# Patient Record
Sex: MALE | Race: ASIAN | NOT HISPANIC OR LATINO | URBAN - METROPOLITAN AREA
[De-identification: names, ages, dates, MRNs, and addresses within clinical notes are randomized per-mention and may not be internally consistent; named-entity substitution may affect disease eponyms.]

---

## 2017-04-09 ENCOUNTER — EMERGENCY (EMERGENCY)
Facility: HOSPITAL | Age: 62
LOS: 1 days | Discharge: HOME | End: 2017-04-09
Attending: EMERGENCY MEDICINE | Admitting: EMERGENCY MEDICINE
Payer: COMMERCIAL

## 2017-04-09 VITALS
SYSTOLIC BLOOD PRESSURE: 122 MMHG | RESPIRATION RATE: 18 BRPM | TEMPERATURE: 98 F | OXYGEN SATURATION: 98 % | HEART RATE: 100 BPM | DIASTOLIC BLOOD PRESSURE: 72 MMHG

## 2017-04-09 VITALS
HEART RATE: 71 BPM | OXYGEN SATURATION: 98 % | SYSTOLIC BLOOD PRESSURE: 119 MMHG | RESPIRATION RATE: 18 BRPM | DIASTOLIC BLOOD PRESSURE: 79 MMHG

## 2017-04-09 PROCEDURE — 99284 EMERGENCY DEPT VISIT MOD MDM: CPT | Mod: 25

## 2017-04-09 PROCEDURE — 93010 ELECTROCARDIOGRAM REPORT: CPT | Mod: NC

## 2017-04-09 RX ORDER — VALACYCLOVIR 500 MG/1
1 TABLET, FILM COATED ORAL
Qty: 21 | Refills: 0 | OUTPATIENT
Start: 2017-04-09 | End: 2017-04-16

## 2017-04-09 NOTE — ED PROVIDER NOTE - OBJECTIVE STATEMENT
62yo M PMHx hypothyroidism, HLD p/w CC left sided facial weakness. Pt. states that symptoms started last night around 10pm, family noticed his mouth was drooping on the left side, pt. states that a couple of days ago he noticed his eyes were tearing more than usual. Pt. is now complaining of difficulty closing left eye. Pt. denies any other weakness, numbness/tingling or imbalance. Denies fever, chills, recent URI symptoms, N/V/D.

## 2017-04-09 NOTE — ED PROVIDER NOTE - PROGRESS NOTE DETAILS
called to [facility] triage for stroke dayal-mali pace, 61m presents with l. facial droop/numbness. Started at 2 am, left side of face including forehead, never occurred before, no other weakness/numbness in body, no difficulty speaking, exam-vs as per triage-fs 92, aox3, left facial droop and paresis including l. forehead, 5/5 strenght in all extremities, sensation grossly intact, f-n nl, likely bells palsy but also given time of onset-no stroke code called.

## 2017-04-09 NOTE — ED PROVIDER NOTE - ATTENDING CONTRIBUTION TO CARE
60yo M PMHx hypothyroidism, HLD p/w CC left sided facial weakness. Pt. states that symptoms started last night around 10pm, family noticed his mouth was drooping on the left side, pt. states that a couple of days ago he noticed his eyes were tearing more than usual. Pt. is now complaining of difficulty closing left eye. Pt. denies any other weakness, numbness/tingling or imbalance. Denies fever, chills, recent URI symptoms, N/V/D. On exam: + Left peripheral VII palsy c/w Bell's. No evidence of any other focal neuro deficits. VSS lungs, heart, pulses, abdomen, extremities, skin, all normal on exam. Eye exam normal.  Plan: eye care instructions, prednisone, valcyclovir and need to follow up with PCP for further work up and care

## 2017-04-09 NOTE — ED PROVIDER NOTE - CHIEF COMPLAINT
The patient is a 61y Male complaining of The patient is a 61y Male complaining of left sided facial weakness.

## 2017-04-09 NOTE — ED ADULT NURSE REASSESSMENT NOTE - NS ED NURSE REASSESS COMMENT FT1
RN Break Coverage: received report on pt. pt presents awake a&ox4, denies dizziness or ha, c/o left sided facial weakness/droop/ eye tearing. pt also endorses numbness and tingling to left face. no additional neuro deficits noted. speech clear, rogers. skin warm, dry, intact, appropriate for race. respirations even unlabored. denies cp or sob. denies any further symptoms at this time. family at bedside. safety maintained. pending disposition at this time. denies any pain or distress.

## 2017-04-09 NOTE — ED ADULT NURSE NOTE - OBJECTIVE STATEMENT
Pt. received in room 10. Pt c/o left ear pain and left facial droop with numbness. Denies dizziness, n/v, headache. States he has mild blurry vision in left eye. Pt is noted with left facial droop, able to move all extremities well no numbness or tingling. Pt. noted having difficulty closing left eyelid and pupils are reactive to light. Lungs are clear upon auscultation. Abdomen soft and non-tender, bowel sounds present. Skin is dry and intact. VS were taken. Saline lock in left hand placed by EMS. MD Freeman at bedside to assess patient.

## 2017-04-09 NOTE — ED ADULT TRIAGE NOTE - CHIEF COMPLAINT QUOTE
p/t c/o of lt sided facial droop since this am and pain behind the lt ear as well no other neuro deficits noted p/t evaluated by JOSELUIS no code stroke initiated fs 91mdl

## 2020-09-01 NOTE — ED PROVIDER NOTE - MEDICAL DECISION MAKING DETAILS
Patient calling in to request refill on Lantus.     Was the patient seen in the last year in this department? Yes    Does patient have an active prescription for medications requested? No     Received Request Via: Patient   Patient notified.    Prophylactic measure 60yo M PMHx hypothyroidism, HLD p/w CC left sided facial weakness, including forehead, consistent w/ [place] Palsy - will give course of steroids and artificial tear drops.

## 2021-08-23 NOTE — ED ADULT NURSE NOTE - BREATH SOUNDS, MLM
Patient call and request a new referral to see Urology,Freddy Carrion,Please Review and sign plan and care if you agree.
Urology referral placed.     Miya Godoy, DO  Family Medicine
Clear

## 2022-07-14 ENCOUNTER — APPOINTMENT (OUTPATIENT)
Dept: CARDIOLOGY | Facility: CLINIC | Age: 67
End: 2022-07-14